# Patient Record
Sex: FEMALE
[De-identification: names, ages, dates, MRNs, and addresses within clinical notes are randomized per-mention and may not be internally consistent; named-entity substitution may affect disease eponyms.]

---

## 2020-07-02 ENCOUNTER — HOSPITAL ENCOUNTER (INPATIENT)
Dept: HOSPITAL 19 - OB | Age: 35
LOS: 1 days | Discharge: HOME | End: 2020-07-03
Payer: OTHER GOVERNMENT

## 2020-07-02 VITALS — HEART RATE: 81 BPM | SYSTOLIC BLOOD PRESSURE: 120 MMHG | TEMPERATURE: 98.1 F | DIASTOLIC BLOOD PRESSURE: 66 MMHG

## 2020-07-02 VITALS — HEART RATE: 64 BPM | SYSTOLIC BLOOD PRESSURE: 104 MMHG | DIASTOLIC BLOOD PRESSURE: 62 MMHG

## 2020-07-02 VITALS — BODY MASS INDEX: 26.43 KG/M2 | HEIGHT: 67.99 IN | WEIGHT: 174.39 LBS

## 2020-07-02 VITALS — SYSTOLIC BLOOD PRESSURE: 114 MMHG | DIASTOLIC BLOOD PRESSURE: 73 MMHG | HEART RATE: 72 BPM

## 2020-07-02 VITALS — SYSTOLIC BLOOD PRESSURE: 99 MMHG | DIASTOLIC BLOOD PRESSURE: 55 MMHG | HEART RATE: 88 BPM

## 2020-07-02 VITALS — SYSTOLIC BLOOD PRESSURE: 107 MMHG | HEART RATE: 65 BPM | DIASTOLIC BLOOD PRESSURE: 67 MMHG

## 2020-07-02 VITALS — DIASTOLIC BLOOD PRESSURE: 53 MMHG | SYSTOLIC BLOOD PRESSURE: 98 MMHG | HEART RATE: 63 BPM

## 2020-07-02 VITALS — SYSTOLIC BLOOD PRESSURE: 110 MMHG | DIASTOLIC BLOOD PRESSURE: 63 MMHG | HEART RATE: 68 BPM

## 2020-07-02 VITALS — TEMPERATURE: 97.3 F

## 2020-07-02 VITALS — SYSTOLIC BLOOD PRESSURE: 112 MMHG | HEART RATE: 69 BPM | DIASTOLIC BLOOD PRESSURE: 62 MMHG

## 2020-07-02 VITALS — HEART RATE: 70 BPM | DIASTOLIC BLOOD PRESSURE: 61 MMHG | SYSTOLIC BLOOD PRESSURE: 100 MMHG

## 2020-07-02 VITALS — DIASTOLIC BLOOD PRESSURE: 58 MMHG | HEART RATE: 70 BPM | SYSTOLIC BLOOD PRESSURE: 1128 MMHG

## 2020-07-02 VITALS — HEART RATE: 55 BPM | DIASTOLIC BLOOD PRESSURE: 65 MMHG | SYSTOLIC BLOOD PRESSURE: 111 MMHG

## 2020-07-02 VITALS — SYSTOLIC BLOOD PRESSURE: 121 MMHG | DIASTOLIC BLOOD PRESSURE: 60 MMHG | HEART RATE: 72 BPM

## 2020-07-02 VITALS — TEMPERATURE: 98.1 F | HEART RATE: 79 BPM | DIASTOLIC BLOOD PRESSURE: 64 MMHG | SYSTOLIC BLOOD PRESSURE: 102 MMHG

## 2020-07-02 VITALS — SYSTOLIC BLOOD PRESSURE: 114 MMHG | TEMPERATURE: 97.3 F | DIASTOLIC BLOOD PRESSURE: 67 MMHG | HEART RATE: 63 BPM

## 2020-07-02 VITALS — SYSTOLIC BLOOD PRESSURE: 100 MMHG | HEART RATE: 64 BPM | DIASTOLIC BLOOD PRESSURE: 56 MMHG

## 2020-07-02 VITALS — HEART RATE: 68 BPM | SYSTOLIC BLOOD PRESSURE: 99 MMHG | DIASTOLIC BLOOD PRESSURE: 58 MMHG

## 2020-07-02 VITALS — HEART RATE: 69 BPM | SYSTOLIC BLOOD PRESSURE: 119 MMHG | DIASTOLIC BLOOD PRESSURE: 69 MMHG

## 2020-07-02 VITALS — DIASTOLIC BLOOD PRESSURE: 66 MMHG | HEART RATE: 68 BPM | SYSTOLIC BLOOD PRESSURE: 111 MMHG

## 2020-07-02 VITALS — HEART RATE: 77 BPM | SYSTOLIC BLOOD PRESSURE: 113 MMHG | DIASTOLIC BLOOD PRESSURE: 63 MMHG

## 2020-07-02 VITALS — HEART RATE: 82 BPM | SYSTOLIC BLOOD PRESSURE: 103 MMHG | DIASTOLIC BLOOD PRESSURE: 67 MMHG

## 2020-07-02 VITALS — DIASTOLIC BLOOD PRESSURE: 63 MMHG | SYSTOLIC BLOOD PRESSURE: 105 MMHG | HEART RATE: 72 BPM

## 2020-07-02 VITALS — HEART RATE: 67 BPM | SYSTOLIC BLOOD PRESSURE: 102 MMHG | DIASTOLIC BLOOD PRESSURE: 57 MMHG

## 2020-07-02 VITALS — DIASTOLIC BLOOD PRESSURE: 64 MMHG | HEART RATE: 71 BPM | SYSTOLIC BLOOD PRESSURE: 101 MMHG

## 2020-07-02 VITALS — DIASTOLIC BLOOD PRESSURE: 54 MMHG | TEMPERATURE: 98.1 F | SYSTOLIC BLOOD PRESSURE: 105 MMHG | HEART RATE: 65 BPM

## 2020-07-02 VITALS — SYSTOLIC BLOOD PRESSURE: 101 MMHG | DIASTOLIC BLOOD PRESSURE: 59 MMHG | HEART RATE: 64 BPM

## 2020-07-02 VITALS — SYSTOLIC BLOOD PRESSURE: 101 MMHG | TEMPERATURE: 98.2 F | HEART RATE: 59 BPM | DIASTOLIC BLOOD PRESSURE: 55 MMHG

## 2020-07-02 DIAGNOSIS — Z3A.39: ICD-10-CM

## 2020-07-02 DIAGNOSIS — D64.9: ICD-10-CM

## 2020-07-02 LAB
BASOPHILS # BLD: 0 10*3/UL (ref 0–0.2)
BASOPHILS NFR BLD AUTO: 0.4 % (ref 0–2)
EOSINOPHIL # BLD: 0 10*3/UL (ref 0–0.7)
EOSINOPHIL NFR BLD: 0.3 % (ref 0–4)
ERYTHROCYTE [DISTWIDTH] IN BLOOD BY AUTOMATED COUNT: 14.8 % (ref 11.5–14.5)
GRANULOCYTES # BLD AUTO: 73.9 % (ref 42.2–75.2)
HCT VFR BLD AUTO: 39.5 % (ref 37–47)
HGB BLD-MCNC: 13.4 G/DL (ref 12.5–16)
LYMPHOCYTES # BLD: 1.5 10*3/UL (ref 1.2–3.4)
LYMPHOCYTES NFR BLD: 18.6 % (ref 20–51)
MCH RBC QN AUTO: 32 PG (ref 27–31)
MCHC RBC AUTO-ENTMCNC: 34 G/DL (ref 33–37)
MCV RBC AUTO: 93 FL (ref 80–100)
MONOCYTES # BLD: 0.5 10*3/UL (ref 0.1–0.6)
MONOCYTES NFR BLD AUTO: 6.3 % (ref 1.7–9.3)
NEUTROPHILS # BLD: 5.8 10*3/UL (ref 1.4–6.5)
PLATELET # BLD AUTO: 253 K/MM3 (ref 130–400)
PMV BLD AUTO: 10.2 FL (ref 7.4–10.4)
RBC # BLD AUTO: 4.24 M/MM3 (ref 4.1–5.3)

## 2020-07-02 PROCEDURE — 10907ZC DRAINAGE OF AMNIOTIC FLUID, THERAPEUTIC FROM PRODUCTS OF CONCEPTION, VIA NATURAL OR ARTIFICIAL OPENING: ICD-10-PCS

## 2020-07-02 PROCEDURE — 0KQM0ZZ REPAIR PERINEUM MUSCLE, OPEN APPROACH: ICD-10-PCS

## 2020-07-02 NOTE — NUR
Pt up to side of bed at 1745, epidural catheter removed.  Pt ambulated under
own power to bathroom.  Pt voided 300ml, did own pericare, changed into own
gown.  Clean panties and pad provided.  Pt then ambulated without difficulty
to postpartum room. Pt oriented to room, call light within reach.

## 2020-07-02 NOTE — NUR
1300 - Pt requesting epidural at this time.  LUIZ Bevaer CRNA notified.
 
1331 - STEPHANIE Metz CRNA to pt bedside.  Pt repositioned to sitting at side of
bed.  Test dose given at 1341, pt tolerated well and repositioned for comfort.

## 2020-07-02 NOTE — NUR
Dr. Goodpasture to pt bedside.  Per physician SVE 4/80/-2.  AROM at this time,
clear fluid noted.  Plan of care discussed with pt, plan to continue with
pitocin induction as tolerated.

## 2020-07-02 NOTE — NUR
Pt reports feeling some pressure with contractions.  FHT WNL with good
accelerations.  SVE 5/80/-2, pt comfortable with epidural.

## 2020-07-02 NOTE — NUR
Pt and spouse arrive to unit at 1015 for induction of labor.  Pt changed into
gown, EFM explained and placed, vitals taken.  Consents discussed and signed,
assessment complete, labs drawn, questions invited and answered.  Pt denies
regular contractions, states she has felt some "cramping on and off", denies
leaking of fluid or vaginal bleeding, reports good fetal movement.
 
1054 - Pt off monitor to go to bathroom
 
1058 - Pt back on EFM.  Pitocin started per orders at 2mu/ml/hr at 1100.  SVE
at this time 3/70/-2, cervix very posterior.
 
1116 - Pitocin increased to 4mu/ml/kg
 
1119 - FHT deceleration to 60, RN at bedside.  Pt repositioned to LL at 1120,
repositioned to RL at 1121 with FHT increase to 100 then decreasing again to
65.  Pitocin stopped at this time.  Pt repositioned to LL at 1122, FHT
increased to 120s.  Dr. Goodpasture notified, see physician notification.
 
1123 - FHT deceleration to 100 with recovery over 30 seconds into 120s-140s.
Pt remains in LL position.

## 2020-07-02 NOTE — NUR
1528 - Dr. Goodpasture to pt bedside.  Pt and room prepped for delivery.
 
1531 - Female infant delivered spontaneously by Dr. Goodpasture and placed on
mother's abdomen.  Care of infant transferred to WESTLEY Phelps RN of
nursery.
 
1536 - Placenta spontaneously delivered by Dr. Goodpasture.  Pitocin started
per postpartum protocol.  Cord blood collected.  2nd degree laceration repair
performed by Dr. Goodpasture, see physician notes.
 
1545 - Pt repositioned for comfort.  Ice pack placed on perineum, fundus firm
and at umbilicus with small amount of bleeding noted.

## 2020-07-02 NOTE — NUR
1414 - Pt repositioned semi-fowlers following SVE, FHR deceleration to 115.
Pt repositioned LL.  Pt reports feeling light headed and dizzy,  BP 79/50.  Pt
given 1 10mg dose ephedrine at 1417 and head of bed raised.
 
1418 - FHR deceleration into 80s, RN at bedside.  Pt repositioned RL, FHR
quickly increased into 120s.  Late deceleration following next contraction
to 100 with quick recovery to 120.  Pt remains in RL position.
 
1450 - Hopkins catheter inserted, pt repositioned for comfort
 
1509 - Pt's  to nurse desk to report pt feeling "leaking".  RN to
bedside.  Pt reports feeling fluid coming out with contractions and now
constant pressure in bottom.  SVE 10/100/+1, Dr. Goodpasture notified, pitocin
stopped.  FHT difficut to trace at this time, RN remains at bedside, audible
FHT between 70s and 150s.  REGAN Read to bedside to assist, WESTLEY Sousa RN of nursery notified.

## 2020-07-03 VITALS — TEMPERATURE: 97.7 F | HEART RATE: 69 BPM | DIASTOLIC BLOOD PRESSURE: 59 MMHG | SYSTOLIC BLOOD PRESSURE: 102 MMHG

## 2020-07-03 VITALS — SYSTOLIC BLOOD PRESSURE: 101 MMHG | HEART RATE: 78 BPM | DIASTOLIC BLOOD PRESSURE: 60 MMHG | TEMPERATURE: 97.9 F

## 2020-07-03 VITALS — SYSTOLIC BLOOD PRESSURE: 122 MMHG | TEMPERATURE: 98.5 F | DIASTOLIC BLOOD PRESSURE: 71 MMHG | HEART RATE: 62 BPM
